# Patient Record
Sex: FEMALE | Race: WHITE | NOT HISPANIC OR LATINO | Employment: UNEMPLOYED
[De-identification: names, ages, dates, MRNs, and addresses within clinical notes are randomized per-mention and may not be internally consistent; named-entity substitution may affect disease eponyms.]

---

## 2020-06-23 ENCOUNTER — OFFICE VISIT (OUTPATIENT)
Age: 60
End: 2020-06-23

## 2020-08-24 ENCOUNTER — OFFICE VISIT (OUTPATIENT)
Age: 60
End: 2020-08-24

## 2020-08-24 ENCOUNTER — TELEPHONE ENCOUNTER (OUTPATIENT)
Dept: URBAN - METROPOLITAN AREA CLINIC 9 | Facility: CLINIC | Age: 60
End: 2020-08-24

## 2020-09-01 ENCOUNTER — OFFICE VISIT (OUTPATIENT)
Age: 60
End: 2020-09-01

## 2020-09-14 ENCOUNTER — OFFICE VISIT (OUTPATIENT)
Dept: URBAN - METROPOLITAN AREA SURGERY CENTER 5 | Facility: SURGERY CENTER | Age: 60
End: 2020-09-14

## 2020-09-14 ENCOUNTER — TELEPHONE ENCOUNTER (OUTPATIENT)
Dept: URBAN - METROPOLITAN AREA CLINIC 9 | Facility: CLINIC | Age: 60
End: 2020-09-14

## 2021-02-08 ENCOUNTER — TELEPHONE ENCOUNTER (OUTPATIENT)
Dept: URBAN - METROPOLITAN AREA CLINIC 9 | Facility: CLINIC | Age: 61
End: 2021-02-08

## 2021-03-09 ENCOUNTER — OFFICE VISIT (OUTPATIENT)
Dept: URBAN - METROPOLITAN AREA CLINIC 7 | Facility: CLINIC | Age: 61
End: 2021-03-09

## 2021-03-28 ENCOUNTER — TELEPHONE ENCOUNTER (OUTPATIENT)
Dept: URBAN - METROPOLITAN AREA CLINIC 9 | Facility: CLINIC | Age: 61
End: 2021-03-28

## 2021-04-12 ENCOUNTER — OFFICE VISIT (OUTPATIENT)
Dept: URBAN - METROPOLITAN AREA SURGERY CENTER 9 | Facility: SURGERY CENTER | Age: 61
End: 2021-04-12

## 2021-04-28 ENCOUNTER — TELEPHONE ENCOUNTER (OUTPATIENT)
Dept: URBAN - METROPOLITAN AREA CLINIC 9 | Facility: CLINIC | Age: 61
End: 2021-04-28

## 2021-12-01 ENCOUNTER — OFFICE VISIT (OUTPATIENT)
Age: 61
End: 2021-12-01

## 2022-03-01 ENCOUNTER — OFFICE VISIT (OUTPATIENT)
Dept: URBAN - METROPOLITAN AREA CLINIC 7 | Facility: CLINIC | Age: 62
End: 2022-03-01

## 2022-03-07 ENCOUNTER — TELEPHONE ENCOUNTER (OUTPATIENT)
Dept: URBAN - METROPOLITAN AREA CLINIC 9 | Facility: CLINIC | Age: 62
End: 2022-03-07

## 2022-07-30 ENCOUNTER — TELEPHONE ENCOUNTER (OUTPATIENT)
Age: 62
End: 2022-07-30

## 2022-07-31 ENCOUNTER — TELEPHONE ENCOUNTER (OUTPATIENT)
Age: 62
End: 2022-07-31

## 2023-06-06 ENCOUNTER — LAB OUTSIDE AN ENCOUNTER (OUTPATIENT)
Dept: URBAN - METROPOLITAN AREA CLINIC 7 | Facility: CLINIC | Age: 63
End: 2023-06-06

## 2023-06-06 ENCOUNTER — TELEPHONE ENCOUNTER (OUTPATIENT)
Dept: URBAN - METROPOLITAN AREA CLINIC 7 | Facility: CLINIC | Age: 63
End: 2023-06-06

## 2023-06-19 ENCOUNTER — LAB OUTSIDE AN ENCOUNTER (OUTPATIENT)
Dept: URBAN - METROPOLITAN AREA CLINIC 7 | Facility: CLINIC | Age: 63
End: 2023-06-19

## 2023-06-23 ENCOUNTER — TELEPHONE ENCOUNTER (OUTPATIENT)
Dept: URBAN - METROPOLITAN AREA CLINIC 7 | Facility: CLINIC | Age: 63
End: 2023-06-23

## 2023-07-28 ENCOUNTER — WEB ENCOUNTER (OUTPATIENT)
Dept: URBAN - METROPOLITAN AREA CLINIC 7 | Facility: CLINIC | Age: 63
End: 2023-07-28

## 2023-07-28 ENCOUNTER — DASHBOARD ENCOUNTERS (OUTPATIENT)
Age: 63
End: 2023-07-28

## 2023-07-28 ENCOUNTER — OFFICE VISIT (OUTPATIENT)
Dept: URBAN - METROPOLITAN AREA CLINIC 7 | Facility: CLINIC | Age: 63
End: 2023-07-28
Payer: COMMERCIAL

## 2023-07-28 VITALS
BODY MASS INDEX: 29.03 KG/M2 | WEIGHT: 196 LBS | HEIGHT: 69 IN | DIASTOLIC BLOOD PRESSURE: 80 MMHG | TEMPERATURE: 98 F | SYSTOLIC BLOOD PRESSURE: 136 MMHG

## 2023-07-28 DIAGNOSIS — Z80.0 FAMILY HISTORY OF COLON CANCER: ICD-10-CM

## 2023-07-28 DIAGNOSIS — K86.2 PANCREATIC CYST: ICD-10-CM

## 2023-07-28 DIAGNOSIS — D18.03 HEPATIC HEMANGIOMA: ICD-10-CM

## 2023-07-28 DIAGNOSIS — Z86.010 HISTORY OF COLON POLYPS: ICD-10-CM

## 2023-07-28 PROBLEM — 428283002: Status: ACTIVE | Noted: 2023-07-28

## 2023-07-28 PROCEDURE — 99214 OFFICE O/P EST MOD 30 MIN: CPT | Performed by: INTERNAL MEDICINE

## 2023-07-28 RX ORDER — LISINOPRIL 30 MG/1
TAKE ONE TABLET BY MOUTH ONE TIME DAILY AT BEDTIME TABLET ORAL
Qty: 30 UNSPECIFIED | Refills: 0 | Status: ACTIVE | COMMUNITY

## 2023-07-28 RX ORDER — ESTRADIOL AND NORETHINDRONE ACETATE 1; .5 MG/1; MG/1
TABLET ORAL
Qty: 28 TABLET | Status: ACTIVE | COMMUNITY

## 2023-07-28 RX ORDER — ROSUVASTATIN CALCIUM 5 MG/1
TAKE ONE TABLET BY MOUTH THREE NIGHTS A WEEK ON TUESDAY, WEDNESDAY, AND THURSDAY TABLET, FILM COATED ORAL
Qty: 12 UNSPECIFIED | Refills: 6 | Status: ACTIVE | COMMUNITY

## 2023-07-28 NOTE — HPI-TODAY'S VISIT:
Patient is seen today after a recent imaging study with FU MRI for pancreatic cyst . 1.9cm with septations but stable. Has been stable over last years No family hx pancreatic cancer No diabetes or changes in glycemic control. No abdominal  pain. No fevers or chills. No night sweats. No weight loss. No nausea or vomiting. No dark urine or acholic stools. No change in bowel habits . No travel,sick contacts or new medications. No family history IBD,

## 2025-02-11 ENCOUNTER — TELEPHONE ENCOUNTER (OUTPATIENT)
Dept: URBAN - METROPOLITAN AREA CLINIC 7 | Facility: CLINIC | Age: 65
End: 2025-02-11

## 2025-03-11 ENCOUNTER — TELEPHONE ENCOUNTER (OUTPATIENT)
Dept: URBAN - METROPOLITAN AREA CLINIC 7 | Facility: CLINIC | Age: 65
End: 2025-03-11

## 2025-03-11 ENCOUNTER — LAB OUTSIDE AN ENCOUNTER (OUTPATIENT)
Dept: URBAN - METROPOLITAN AREA CLINIC 7 | Facility: CLINIC | Age: 65
End: 2025-03-11

## 2025-04-03 ENCOUNTER — LAB OUTSIDE AN ENCOUNTER (OUTPATIENT)
Dept: URBAN - METROPOLITAN AREA CLINIC 7 | Facility: CLINIC | Age: 65
End: 2025-04-03

## 2025-04-09 ENCOUNTER — OFFICE VISIT (OUTPATIENT)
Dept: URBAN - METROPOLITAN AREA CLINIC 7 | Facility: CLINIC | Age: 65
End: 2025-04-09
Payer: COMMERCIAL

## 2025-04-09 DIAGNOSIS — D18.03 HEPATIC HEMANGIOMA: ICD-10-CM

## 2025-04-09 DIAGNOSIS — Z80.0 FAMILY HISTORY OF COLON CANCER: ICD-10-CM

## 2025-04-09 DIAGNOSIS — K86.2 PANCREATIC CYST: ICD-10-CM

## 2025-04-09 DIAGNOSIS — Z86.0100 HISTORY OF COLON POLYPS: ICD-10-CM

## 2025-04-09 PROCEDURE — 99213 OFFICE O/P EST LOW 20 MIN: CPT

## 2025-04-09 RX ORDER — ESTRADIOL AND NORETHINDRONE ACETATE 1; .5 MG/1; MG/1
TABLET ORAL
Qty: 28 TABLET | Status: ACTIVE | COMMUNITY

## 2025-04-09 RX ORDER — ROSUVASTATIN CALCIUM 5 MG/1
TAKE ONE TABLET BY MOUTH THREE NIGHTS A WEEK ON TUESDAY, WEDNESDAY, AND THURSDAY TABLET, FILM COATED ORAL
Qty: 12 UNSPECIFIED | Refills: 6 | Status: ACTIVE | COMMUNITY

## 2025-04-09 RX ORDER — LISINOPRIL 30 MG/1
TAKE ONE TABLET BY MOUTH ONE TIME DAILY AT BEDTIME TABLET ORAL
Qty: 30 UNSPECIFIED | Refills: 0 | Status: ACTIVE | COMMUNITY

## 2025-04-09 NOTE — HPI-TODAY'S VISIT:
Patient is a 64-year-old female seen today after a recent imaging study with FU MRI for pancreatic cyst. Family history of colon cancer (father) and personal history of colon poylps.   She has stable pancreatic cystic lesions on recent MRCP compared to prior exam in 2023. Has been stable over last years. No family hx pancreatic cancer. No diabetes or changes in glycemic control. Denies abdominal pain, fevers or chills., night sweats, weight loss, nausea or vomiting, change in bowel habits, blood in stool, dark urine or acholic stools. Has daily stool output without issue.   Last seen 7/2023 by Dr. Vargas for follow-up of pancreatic cyst.  Option of EUS offered to the patient and discussed.  Repeat MRI in 1 year.  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  No acute abnormality.  Benign-appearing bilateral renal cysts.  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  Follow-up MRI pancreas in 1 year recommended. 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  Repeat imaging in 1 year.  Procedures: -EUS 4/2021 with Dr. Patel revealed a cystic lesion seen in the uncinate process of the pancreas.  No sign of significant pathology in the common bile duct or ampulla.  Normal-appearing esophagus and stomach.  Repeat EUS as needed for surveillance.  Continue plan for MRI in 1 year. -Colonoscopy 9/2020 with Dr. Reza Murdock without polyps.  Repeat in 5 years.